# Patient Record
Sex: MALE | Race: WHITE
[De-identification: names, ages, dates, MRNs, and addresses within clinical notes are randomized per-mention and may not be internally consistent; named-entity substitution may affect disease eponyms.]

---

## 2018-05-30 ENCOUNTER — HOSPITAL ENCOUNTER (EMERGENCY)
Dept: HOSPITAL 96 - M.ERS | Age: 48
Discharge: HOME | End: 2018-05-30
Payer: COMMERCIAL

## 2018-05-30 VITALS — WEIGHT: 210.01 LBS | HEIGHT: 68 IN | BODY MASS INDEX: 31.83 KG/M2

## 2018-05-30 VITALS — DIASTOLIC BLOOD PRESSURE: 70 MMHG | SYSTOLIC BLOOD PRESSURE: 122 MMHG
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
## 2018-05-30 DIAGNOSIS — S62.101A: Primary | ICD-10-CM

## 2018-05-30 DIAGNOSIS — S63.274A: ICD-10-CM

## 2018-05-30 DIAGNOSIS — S81.811A: ICD-10-CM

## 2018-05-30 LAB
ABSOLUTE BASOPHILS: 0 THOU/UL (ref 0–0.2)
ABSOLUTE EOSINOPHILS: 0.1 THOU/UL (ref 0–0.7)
ABSOLUTE MONOCYTES: 0.5 THOU/UL (ref 0–1.2)
ALBUMIN SERPL-MCNC: 3.9 G/DL (ref 3.4–5)
ALP SERPL-CCNC: 41 U/L (ref 46–116)
ALT SERPL-CCNC: 23 U/L (ref 30–65)
ANION GAP SERPL CALC-SCNC: 9 MMOL/L (ref 7–16)
APTT BLD: 22.6 SECONDS (ref 25–31.3)
AST SERPL-CCNC: 17 U/L (ref 15–37)
BASOPHILS NFR BLD AUTO: 0.5 %
BILIRUB SERPL-MCNC: 0.3 MG/DL
BILIRUB UR-MCNC: NEGATIVE MG/DL
BUN SERPL-MCNC: 22 MG/DL (ref 7–18)
CALCIUM SERPL-MCNC: 8.4 MG/DL (ref 8.5–10.1)
CHLORIDE SERPL-SCNC: 103 MMOL/L (ref 98–107)
CO2 SERPL-SCNC: 26 MMOL/L (ref 21–32)
COLOR UR: YELLOW
CREAT SERPL-MCNC: 1 MG/DL (ref 0.6–1.3)
EOSINOPHIL NFR BLD: 1 %
GLUCOSE SERPL-MCNC: 132 MG/DL (ref 70–99)
GRANULOCYTES NFR BLD MANUAL: 66.8 %
HCT VFR BLD CALC: 42.9 % (ref 42–52)
HGB BLD-MCNC: 14.6 GM/DL (ref 14–18)
INR PPP: 1.1
KETONES UR STRIP-MCNC: NEGATIVE MG/DL
LIPASE: 182 U/L (ref 73–393)
LYMPHOCYTES # BLD: 2.1 THOU/UL (ref 0.8–5.3)
LYMPHOCYTES NFR BLD AUTO: 25.8 %
MCH RBC QN AUTO: 29.9 PG (ref 26–34)
MCHC RBC AUTO-ENTMCNC: 34 G/DL (ref 28–37)
MCV RBC: 87.8 FL (ref 80–100)
MONOCYTES NFR BLD: 5.9 %
MPV: 8.1 FL. (ref 7.2–11.1)
NEUTROPHILS # BLD: 5.4 THOU/UL (ref 1.6–8.1)
NUCLEATED RBCS: 0 /100WBC
PLATELET COUNT*: 196 THOU/UL (ref 150–400)
POTASSIUM SERPL-SCNC: 3.7 MMOL/L (ref 3.5–5.1)
PROT SERPL-MCNC: 7.2 G/DL (ref 6.4–8.2)
PROT UR QL STRIP: NEGATIVE
PROTHROMBIN TIME: 10.3 SECONDS (ref 9.2–11.5)
RBC # BLD AUTO: 4.88 MIL/UL (ref 4.5–6)
RBC # UR STRIP: NEGATIVE /UL
RDW-CV: 13.2 % (ref 10.5–14.5)
SODIUM SERPL-SCNC: 138 MMOL/L (ref 136–145)
SP GR UR STRIP: >= 1.03 (ref 1–1.03)
URINE CLARITY: CLEAR
URINE GLUCOSE-RANDOM: NEGATIVE
URINE LEUKOCYTES-REFLEX: NEGATIVE
URINE NITRITE-REFLEX: NEGATIVE
UROBILINOGEN UR STRIP-ACNC: 0.2 E.U./DL (ref 0.2–1)
WBC # BLD AUTO: 8.1 THOU/UL (ref 4–11)

## 2018-06-01 ENCOUNTER — HOSPITAL ENCOUNTER (EMERGENCY)
Dept: HOSPITAL 96 - M.ERS | Age: 48
Discharge: HOME | End: 2018-06-01
Payer: COMMERCIAL

## 2018-06-01 VITALS — DIASTOLIC BLOOD PRESSURE: 71 MMHG | SYSTOLIC BLOOD PRESSURE: 125 MMHG

## 2018-06-01 VITALS — WEIGHT: 210.01 LBS | HEIGHT: 68 IN | BODY MASS INDEX: 31.83 KG/M2
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
## 2018-06-01 DIAGNOSIS — S70.11XD: Primary | ICD-10-CM
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
## 2018-06-21 ENCOUNTER — HOSPITAL ENCOUNTER (OUTPATIENT)
Dept: HOSPITAL 96 - M.RAD | Age: 48
End: 2018-06-21
Attending: ORTHOPAEDIC SURGERY
Payer: COMMERCIAL

## 2018-06-21 DIAGNOSIS — S52.501A: Primary | ICD-10-CM
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
## 2018-07-12 ENCOUNTER — HOSPITAL ENCOUNTER (OUTPATIENT)
Dept: HOSPITAL 96 - M.RAD | Age: 48
End: 2018-07-12
Attending: ORTHOPAEDIC SURGERY
Payer: COMMERCIAL
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
## 2018-07-12 DIAGNOSIS — S63.501A: ICD-10-CM
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
## 2018-07-12 DIAGNOSIS — S52.514A: Primary | ICD-10-CM
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
## 2020-02-13 NOTE — NUR
MANUELA NOTIFIED UPON PT RETURN FROM CT. PT WAS NOT CONNECTED TO MONITOR AS HE
WAS NOT CONNECTED PRIOR TO CT

## 2020-02-13 NOTE — PROC
[address]  [address]                    PROCEDURE REPORT              
_______________________________________________________________________________
 
Name:       ALEJANDRA CANDELARIO             Room:           [address] [place]  
M.R.#:  [id number]      Account #:      [id number]  
Admission:  02/14/20     Attend Phys:    DARCI Gallego
Discharge:  02/15/20     Date of Birth:  [birth year]  
         Report #: [id number]
                                                                                
_______________________________________________________________________________
THIS REPORT FOR:  //name//                      
 
cc:  LOLIS - No family physician/PCP 
     FAM - No family physician/PCP                                        ~
THIS REPORT FOR:  //name//                      
 
For GI report, please see the Provation report in Perceptive 7 content.
 
 
 
 
 
 
 
 
 
 
 
 
 
 
 
 
 
 
 
 
 
 
 
 
 
 
 
 
 
 
 
 
 
 
 
 
 
                       
                                        By:                                
                 
D: 02/14/20     _______________________________________
T: 02/19/20 0919Medical Records Staff [facility]       /AL

## 2020-02-13 NOTE — NUR
CALLED CT REPORT TO DR. REARDON. ORDERS RECV'D. UPDATED PATIENT & WIFE.
NOTIFIED GI OF CT RESULTS. DR. TILLEY GAVE ORDERS. SPOKE WITH DR. VASQUEZ
WHO AGREED PATIENT COULD HAVE EGD/COLONOSCOPY TOMORROW AS LONG AS THERE WERE
NOT ANY CLINICAL CHANGES. DR. HUNTER WILL BE ANESTHESIOLOGIST TOMORROW. DR. TILLEY AND DR REARDON NOTIFIED OF ANESTHESIA'S ANSWER. ORDERS RECV'D FROM
DR. TILLEY AND DR. REARDON. PATIENT AND ER RN UPDATED ON PLAN OF CARE:
COLON PREP TONIGHT, STOP XARELTO AND START HEPARIN GTT. PLAN FOR EGD &
COLONOSCOPY TOMORROW IF PATIENT CLEAR FROM COLON PREP.

## 2020-02-14 NOTE — NUR
RECEIVED REPORT FROM ER RN CHEIKH AT 1945.  PT ARRIVED TO UNIT AT 2000 VIA ER
CART. PT AAOX4. ORIENTED TO ROOM AND CALL LIGHT. NURSING ASSESSMENT COMPLETED.
BOWEL PREP ADMINISTERED AND COMPLETED BY MIDNIGHT. NPO FOR COLONOSCOPY. HOURLY
ROUNDING COMPLETED. CALL LIGHT WITHIN REACH. NEGATIVE SEPSIS SCREENING THIS
SHIFT.

## 2020-02-15 NOTE — NUR
ASSUMED CARE OF PT AT 1900. PT IS ALERT AND ORIENTED. VSS. PERRLA. PT IS UP AD
ROSEMARIE. PT IS NPO FOR SURGERY. PT IS IN SINUS RYTHM ON THE TELEMETRY. PT IS
RESTING COMFORTABLY IN BED. RESPIRATIONS ARE EVEN AND NONLABORED. WILL
CONTINUE TO MONITOR PT.

## 2020-02-15 NOTE — NUR
ASSUMED PT CARE AT 0730. ASSESSMENT COMPLETED AS CHARTED. ABLE TO MAKE NEEDS
KNOWN. UP AD ROSEMARIE. SURGERY TODAY AT AROUND 8, CAME BACK AROUND 11. PT ON
HEPARIN TILL ABOUT 30 MINS BEFORE DISCHARGE. NO C/O PAIN. RIGHT LEG SORENESS
DOWNSTAIRS. DISCHARGE APPROVED. DISCHARGE PAPERWORK, SCRIPS, AND MED INFO
GIVEN. NO COMMENTS, QUESTIONS, OR CONCERNS NOTED. IV AND HEART MONITOR
REMOVED. PT LEFT IN WHEELCHAIR DOWN TO WIFES CAR AT AROUND 1358 WITH ALL
BELONGINGS IN TOW.

## 2020-02-19 NOTE — OP
[facility] 
[address] [place]  [address]                    OPERATIVE REPORT              
_______________________________________________________________________________
 
Name:       KODAKALEJANDRASTEPHANIE ALCANTARA             Room:           [address] [place]  
M.R.#:  [id number]      Account #:      [id number]  
Admission:  02/14/20     Attend Phys:    DARCI Gallego
Discharge:  02/15/20     Date of Birth:  [birth year]  
         Report #: [id number]
                                                                     [id number]  
_______________________________________________________________________________
THIS REPORT FOR:  //name//                      
 
cc:  LOLIS Basilio family physician/PCP 
     LOLIS - Chetna family physician/PCP                                        ~
THIS REPORT FOR:  //name//                      
 
CC: LOLIS physician/PCP
    Stan Humphreys
 
DATE OF SERVICE:  02/15/2020
 
 
PREOPERATIVE DIAGNOSIS:  Severe and extensive deep venous thrombosis, right
lower extremity.
 
POSTOPERATIVE DIAGNOSIS:  Severe and extensive deep venous thrombosis, right
lower extremity.
 
PROCEDURES:
1.  Venous thrombectomy.
2.  Ultrasound guidance for venous access right popliteal vein.
3.  Venogram right lower extremity.
4.  Central venogram inferior vena cava.
 
FINDINGS
1.  There is extensive thrombus from the right external iliac vein, common
femoral vein and superficial femoral veins.
2.  After thrombectomy these are widely patent.
3.  Inferior vena cava is widely patent as well as the proximal iliac venous
system on the right.
4.  Popliteal vein and distally are free of thrombus.
 
SURGEON:  Daniel Martinez MD
 
ASSISTANT:  None.
 
ANESTHESIA:  Local sedation.
 
COMPLICATIONS:  None.
 
ESTIMATED BLOOD LOSS:  Minimal.
 
INDICATIONS FOR PROCEDURE:  The patient is a very pleasant 49-year-old white
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  He presented with sudden
onset of right lower extremity swelling 4 days ago.  Ultrasound revealed
 
 
 
[facility] 
[address] [place]  [address]                    OPERATIVE REPORT              
_______________________________________________________________________________
 
Name:       KODAKALEJANDRA QUINTON             Room:           [address] [place]  
[facility].#:  [id number]      Account #:      [id number]  
Admission:  02/14/20     Attend Phys:    DARCI Gallego
Discharge:  02/15/20     Date of Birth:  [birth year]  
         Report #: [id number]
                                                                     [id number]  
_______________________________________________________________________________
extensive thrombosis throughout the right lower extremity.  This is severe
lifestyle limiting and he is young.  For this reason, I am recommending open
thrombectomy to debulk the thrombus.  Informed consent was obtained from the
patient with risks including but not limited to bleeding, infection, need for
further surgery, pain, death, heart attack, stroke, recurrence.  The patient
understood these risks and was agreeable to proceed.
 
DESCRIPTION OF PROCEDURE:  The patient was taken to the angio suite, placed in
the prone position.  Timeout was performed.  The patient's right popliteal fossa
was prepped and draped in usual sterile fashion.  I infused 10 mL of 1%
lidocaine for local anesthetic.  Under ultrasound guidance with an image saved,
I cannulated the right popliteal vein without difficulty.  I used Seldinger
technique to exchange out for a 6-Danish sheath.  I carefully passed a wire
through the thrombus into the inferior vena cava.  I passed an Omniflush
catheter into the inferior vena cava performed inferior venacavogram.  I
withdrew the catheter into the iliac and femoral system performing venograms
identifying the extent of the clot.  I then opened an artery clot Trevo sheath. 
I upsized to the sheath.  I then opened the clot Trevo device.  I passed a clot
Trevo device through the thrombus and deployed it proximally.  I then withdrew
the clot Trevo device deployed removing a large amount of thrombus.  I made
numerous passes #5 removing a large amount of thrombus until I no longer
withdrew thrombus.  On each draw, I did perform suction thrombectomy through the
side port.  I returned a large amount of fresh thrombus as well as multiple
large pieces of more chronic-appearing thrombus.  Once I withdrew with no
further thrombus I repeated my venogram, which showed no residual thrombus
throughout with an excellent result, all the way into the inferior vena cava. 
Through the sheath and put a Monocryl stitch in the skin we held pressure for 10
minutes.  There was no bleeding or hematoma.  The patient tolerated the
procedure well and was taken alert and awake to recovery room in good condition.
 All needle and instrument counts were correct.
 
 
 
 
 
 
 
 
 
 
 
 
 
 
<ELECTRONICALLY SIGNED>
                                        By:  Daniel Martinez MD             
02/19/20     1711
D: 02/15/20 0959_______________________________________
T: 02/15/20 1135Daniel Martinez MD                /nt

## 2021-02-24 NOTE — [place]KG
[place] [address]
Phone:  [phone]                     ELECTROCARDIOGRAM REPORT      
_______________________________________________________________________________
 
Name:         ALEJANDRA CANDELARIO                Room:                     [facility]#:    [id number]     Account #:     [id number]  
Admission:    21    Attend Phys:                     
Discharge:    21    Date of Birth: [birth year]  
Date of Service: 21 1104  Report #:      [id number]
        [id number]
_______________________________________________________________________________
THIS REPORT FOR:  //name//                      
 
                         [facility] ED
                                       
Test Date:    2021               Test Time:    11:04:42
Pat Name:     ALEJANDRA CANDELARIO             Department:   
Patient ID:   [id number]            Room:          
Gender:                               Technician:   [place]
:          1970               Requested By: Rosenda Silva
Order Number: [id number] MD:   Jin Briscoe
                                 Measurements
Intervals                              Axis          
Rate:         51                       P:            30
CA:           163                      QRS:          3
QRSD:         100                      T:            20
QT:           405                                    
QTc:          373                                    
                           Interpretive Statements
Sinus rhythm
Abnormal R-wave progression, early transition
Left ventricular hypertrophy
No previous ECG available for comparison
Electronically Signed On 2021 15:35:54 CST by Jin Briscoe
[email or web address]
 
 
 
 
 
 
 
 
 
 
 
 
 
 
 
 
 
 
 
 
  <ELECTRONICALLY SIGNED>
                                           By: Jin Briscoe MD, [facility]     
  21     1535
D: 21 1104   _____________________________________
T: 21 1104   Jin Briscoe MD, FAC       /EPI

## 2021-08-07 NOTE — CON
[address]  [address]                    CONSULTATION                  
_______________________________________________________________________________
 
Name:       KODAKALEJANDRA D                 Room:           [address] [place]  
M.R.#:  [id number]      Account #:      [id number]  
Admission:  08/08/21     Attend Phys:    DARCI Gallego
Discharge:  08/10/21     Date of Birth:  [birth year]  
         Report #: [id number]
                                                                     [id number]
_______________________________________________________________________________
THIS REPORT FOR:  
 
cc:  Timoteo Philip Vincent R. DO Elia, Manana MD                                                    ~
 
 
DATE OF CONSULTATION: 08/10/2021
 
REQUESTING PHYSICIAN:  Lio Mccord MD.
 
REASON FOR CONSULTATION:  Recurrent pulmonary emboli.
 
HISTORY OF PRESENT ILLNESS:  The patient is a pleasant 51-year-old man who is 
admitted to the hospital with complaints of left lower extremity pain and 
dyspnea.  He had an ultrasound of lower extremity done, which showed occlusive 
DVT.  Chest CT shows nonocclusive pulmonary artery emboli within bilateral lower
lobe segmental and subsegmental pulmonary artery branches.  The patient is 
admitted to the hospital for anticoagulation.  Oncology consultation with 
Hematology consult is requested.  He underwent left lower extremity 
thrombectomy.  He is feeling better.  He does not have chest pain or shortness 
of breath.  He has history of PE and DVT in 2020.  He was hospitalized at [facility].  He had a hypercoagulable workup, which was positive for lupus 
anticoagulant.  Anticardiolipin antibodies were negative.  ____ hypercoagulable 
workup was negative.  He was seen by Dr. Navarro as an outpatient.  He continued 
anticoagulation for 6 months. Repeated hypercoagulable workup did not show any 
abnormality.  Dr. Navarro recommended to discontinue chronic anticoagulation.  He 
was doing fine until recently, developed DVT and PE again.  No contributory 
factor today on this episode.
 
PAST MEDICAL HISTORY:  Significant for DVT in right lower extremity and PE.
 
SOCIAL HISTORY:  He is .  Wife is a nurse, working at [facility].  He does not smoke, does not drink alcohol excessively.
 
FAMILY HISTORY:  Noncontributory.
 
REVIEW OF SYSTEMS:  See above.  He does not have complaints of recent weight 
loss.  Denies cough, hemoptysis.  Denies nausea, vomiting, diarrhea.  Denies 
headaches.
 
PHYSICAL EXAMINATION:
GENERAL:  Well-developed, well-nourished man, not in acute distress.
VITAL SIGNS:  Blood pressure 146/68, heart rate is 66, temperature 98.3.
HEENT:  Neck is supple.  No thrush.
HEART:  Normal S1, S2.
 
 
 
[place] [address]                    CONSULTATION                  
_______________________________________________________________________________
 
Name:       ALEJANDRA CANDELARIO                 Room:           [address]#:  [id number]      Account #:      [id number]  
Admission:  08/08/21     Attend Phys:    DARCI Gallego
Discharge:  08/10/21     Date of Birth:  [birth year]  
         Report #: [id number]
                                                                     [id number]
_______________________________________________________________________________
 
 
LUNGS:  Clear.
ABDOMEN:  Soft.
LOWER EXTREMITIES:  No swelling, no edema.  There is no peripheral 
lymphadenopathy.
 
LABORATORY DATA:  White count 7.3, hemoglobin 13.2, platelets 169.  Doppler 
ultrasound shows occlusive thrombus in the left popliteal and femoral veins.  CT
of chest reviewed, PE as above.
 
ASSESSMENT AND PLAN:  Recurrent deep venous thrombosis and recurrent pulmonary 
emboli.  Agree with anticoagulation.  The patient is being discharged today.  
Advised to see Dr. Navarro as an outpatient for further workup.  Most likely he 
will need lifelong anticoagulation at this point.
 
Thank you very much for allowing me to participate in the care of this patient.
 
 
 
 
 
 
 
 
 
 
 
 
 
 
 
 
 
 
 
 
 
 
 
 
 
 
 
                       
                                        By:                                
                 
D: 08/10/21 2232_______________________________________
T: 08/10/21 2325Shanta Mcfarland MD                  /nt

## 2021-08-10 NOTE — NUR
patient discharged to home
dcd via wc to [facility] car
dc instructions given with paperwork
iv and heart monitor removed
personal belongings returned

## 2021-08-10 NOTE — NUR
CM ASSESSMENT:
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 PT RESIDES AT HOME
WITH SPOUSE AND SHE IS AN RN. PT USES 0 DME. PT HAS 0 HX OF HH OR SNF. PT'S
SPOUSE INFORMS THAT THEY HAVE INFORMED OF POSSIBLE D/C HOME TODAY AND THAT HE
WILL NEED BLOOD THINNERS. PT HAS BEEN ON BLOOD THINNERS IN THE PAST AND IT WAS
COVERED BY HIS INSURANCE ($25 CO-PAY FOR 3 MONTHS). NO OTHER CM NEEDS
ANTICIPATED. CM WILL REMAIN AVAILABLE TO ASSIST AND FOLLOW AS NEEDED.